# Patient Record
Sex: MALE | Race: WHITE | Employment: OTHER | URBAN - METROPOLITAN AREA
[De-identification: names, ages, dates, MRNs, and addresses within clinical notes are randomized per-mention and may not be internally consistent; named-entity substitution may affect disease eponyms.]

---

## 2022-02-04 ENCOUNTER — APPOINTMENT (OUTPATIENT)
Dept: GENERAL RADIOLOGY | Age: 52
End: 2022-02-04
Attending: PHYSICIAN ASSISTANT

## 2022-02-04 ENCOUNTER — HOSPITAL ENCOUNTER (EMERGENCY)
Age: 52
Discharge: HOME | End: 2022-02-04
Attending: EMERGENCY MEDICINE

## 2022-02-04 ENCOUNTER — APPOINTMENT (OUTPATIENT)
Dept: MRI IMAGING | Age: 52
End: 2022-02-04
Attending: PHYSICIAN ASSISTANT

## 2022-02-04 ENCOUNTER — APPOINTMENT (OUTPATIENT)
Dept: CT IMAGING | Age: 52
End: 2022-02-04
Attending: PHYSICIAN ASSISTANT

## 2022-02-04 VITALS
HEART RATE: 64 BPM | RESPIRATION RATE: 17 BRPM | BODY MASS INDEX: 28.8 KG/M2 | SYSTOLIC BLOOD PRESSURE: 170 MMHG | TEMPERATURE: 97.9 F | DIASTOLIC BLOOD PRESSURE: 82 MMHG | OXYGEN SATURATION: 96 % | WEIGHT: 195 LBS

## 2022-02-04 DIAGNOSIS — R42 DIZZINESS: Primary | ICD-10-CM

## 2022-02-04 DIAGNOSIS — R20.0 LEFT FACIAL NUMBNESS: ICD-10-CM

## 2022-02-04 DIAGNOSIS — I10 HYPERTENSION, UNSPECIFIED TYPE: ICD-10-CM

## 2022-02-04 LAB
AMPHET UR QL SCN: NEGATIVE
ANION GAP SERPL CALC-SCNC: 2 MMOL/L (ref 3–18)
APTT PPP: 27.8 SEC (ref 23–36.4)
BARBITURATES UR QL SCN: NEGATIVE
BASOPHILS # BLD: 0 K/UL (ref 0–0.1)
BASOPHILS NFR BLD: 0 % (ref 0–2)
BENZODIAZ UR QL: NEGATIVE
BUN SERPL-MCNC: 20 MG/DL (ref 7–18)
BUN/CREAT SERPL: 16 (ref 12–20)
CALCIUM SERPL-MCNC: 9.5 MG/DL (ref 8.5–10.1)
CANNABINOIDS UR QL SCN: NEGATIVE
CHLORIDE SERPL-SCNC: 109 MMOL/L (ref 100–111)
CHOLEST SERPL-MCNC: 233 MG/DL
CK MB CFR SERPL CALC: 1.6 % (ref 0–4)
CK MB SERPL-MCNC: 1.8 NG/ML (ref 5–25)
CK SERPL-CCNC: 115 U/L (ref 39–308)
CO2 SERPL-SCNC: 29 MMOL/L (ref 21–32)
COCAINE UR QL SCN: NEGATIVE
CREAT SERPL-MCNC: 1.26 MG/DL (ref 0.6–1.3)
DIFFERENTIAL METHOD BLD: ABNORMAL
EOSINOPHIL # BLD: 0.2 K/UL (ref 0–0.4)
EOSINOPHIL NFR BLD: 3 % (ref 0–5)
ERYTHROCYTE [DISTWIDTH] IN BLOOD BY AUTOMATED COUNT: 12.8 % (ref 11.6–14.5)
EST. AVERAGE GLUCOSE BLD GHB EST-MCNC: 117 MG/DL
ETHANOL SERPL-MCNC: <3 MG/DL (ref 0–3)
GLUCOSE BLD STRIP.AUTO-MCNC: 117 MG/DL (ref 70–110)
GLUCOSE SERPL-MCNC: 129 MG/DL (ref 74–99)
HBA1C MFR BLD: 5.7 % (ref 4.2–5.6)
HCT VFR BLD AUTO: 44.3 % (ref 36–48)
HDLC SERPL-MCNC: 50 MG/DL (ref 40–60)
HDLC SERPL: 4.7 {RATIO} (ref 0–5)
HDSCOM,HDSCOM: NORMAL
HGB BLD-MCNC: 15.3 G/DL (ref 13–16)
IMM GRANULOCYTES # BLD AUTO: 0 K/UL (ref 0–0.04)
IMM GRANULOCYTES NFR BLD AUTO: 1 % (ref 0–0.5)
INR PPP: 1 (ref 0.8–1.2)
LDLC SERPL CALC-MCNC: 146.6 MG/DL (ref 0–100)
LIPID PROFILE,FLP: ABNORMAL
LYMPHOCYTES # BLD: 1.3 K/UL (ref 0.9–3.6)
LYMPHOCYTES NFR BLD: 19 % (ref 21–52)
MCH RBC QN AUTO: 31.4 PG (ref 24–34)
MCHC RBC AUTO-ENTMCNC: 34.5 G/DL (ref 31–37)
MCV RBC AUTO: 90.8 FL (ref 78–100)
METHADONE UR QL: NEGATIVE
MONOCYTES # BLD: 0.6 K/UL (ref 0.05–1.2)
MONOCYTES NFR BLD: 8 % (ref 3–10)
NEUTS SEG # BLD: 4.8 K/UL (ref 1.8–8)
NEUTS SEG NFR BLD: 69 % (ref 40–73)
NRBC # BLD: 0 K/UL (ref 0–0.01)
NRBC BLD-RTO: 0 PER 100 WBC
OPIATES UR QL: NEGATIVE
PCP UR QL: NEGATIVE
PLATELET # BLD AUTO: 219 K/UL (ref 135–420)
PMV BLD AUTO: 10.6 FL (ref 9.2–11.8)
POTASSIUM SERPL-SCNC: 4.7 MMOL/L (ref 3.5–5.5)
PROTHROMBIN TIME: 12.4 SEC (ref 11.5–15.2)
RBC # BLD AUTO: 4.88 M/UL (ref 4.35–5.65)
SODIUM SERPL-SCNC: 140 MMOL/L (ref 136–145)
TRIGL SERPL-MCNC: 182 MG/DL (ref ?–150)
TROPONIN-HIGH SENSITIVITY: 4 NG/L (ref 0–78)
TSH SERPL DL<=0.05 MIU/L-ACNC: 3.71 UIU/ML (ref 0.36–3.74)
VLDLC SERPL CALC-MCNC: 36.4 MG/DL
WBC # BLD AUTO: 6.9 K/UL (ref 4.6–13.2)

## 2022-02-04 PROCEDURE — 84484 ASSAY OF TROPONIN QUANT: CPT

## 2022-02-04 PROCEDURE — 71045 X-RAY EXAM CHEST 1 VIEW: CPT

## 2022-02-04 PROCEDURE — 70450 CT HEAD/BRAIN W/O DYE: CPT

## 2022-02-04 PROCEDURE — 85025 COMPLETE CBC W/AUTO DIFF WBC: CPT

## 2022-02-04 PROCEDURE — 82077 ASSAY SPEC XCP UR&BREATH IA: CPT

## 2022-02-04 PROCEDURE — 85610 PROTHROMBIN TIME: CPT

## 2022-02-04 PROCEDURE — 82550 ASSAY OF CK (CPK): CPT

## 2022-02-04 PROCEDURE — 82962 GLUCOSE BLOOD TEST: CPT

## 2022-02-04 PROCEDURE — 80048 BASIC METABOLIC PNL TOTAL CA: CPT

## 2022-02-04 PROCEDURE — 99285 EMERGENCY DEPT VISIT HI MDM: CPT

## 2022-02-04 PROCEDURE — 93005 ELECTROCARDIOGRAM TRACING: CPT

## 2022-02-04 PROCEDURE — 74011250636 HC RX REV CODE- 250/636: Performed by: PHYSICIAN ASSISTANT

## 2022-02-04 PROCEDURE — 96374 THER/PROPH/DIAG INJ IV PUSH: CPT

## 2022-02-04 PROCEDURE — 74011250637 HC RX REV CODE- 250/637: Performed by: PHYSICIAN ASSISTANT

## 2022-02-04 PROCEDURE — 82553 CREATINE MB FRACTION: CPT

## 2022-02-04 PROCEDURE — 80307 DRUG TEST PRSMV CHEM ANLYZR: CPT

## 2022-02-04 PROCEDURE — 70551 MRI BRAIN STEM W/O DYE: CPT

## 2022-02-04 PROCEDURE — 85730 THROMBOPLASTIN TIME PARTIAL: CPT

## 2022-02-04 PROCEDURE — 83036 HEMOGLOBIN GLYCOSYLATED A1C: CPT

## 2022-02-04 PROCEDURE — 84443 ASSAY THYROID STIM HORMONE: CPT

## 2022-02-04 PROCEDURE — 80061 LIPID PANEL: CPT

## 2022-02-04 RX ORDER — HYDRALAZINE HYDROCHLORIDE 20 MG/ML
10 INJECTION INTRAMUSCULAR; INTRAVENOUS ONCE
Status: COMPLETED | OUTPATIENT
Start: 2022-02-04 | End: 2022-02-04

## 2022-02-04 RX ORDER — ASPIRIN 325 MG
325 TABLET ORAL
Status: COMPLETED | OUTPATIENT
Start: 2022-02-04 | End: 2022-02-04

## 2022-02-04 RX ORDER — LORAZEPAM 2 MG/ML
1 INJECTION INTRAMUSCULAR ONCE
Status: DISCONTINUED | OUTPATIENT
Start: 2022-02-04 | End: 2022-02-04 | Stop reason: HOSPADM

## 2022-02-04 RX ADMIN — HYDRALAZINE HYDROCHLORIDE 10 MG: 20 INJECTION INTRAMUSCULAR; INTRAVENOUS at 11:44

## 2022-02-04 RX ADMIN — SODIUM CHLORIDE, POTASSIUM CHLORIDE, SODIUM LACTATE AND CALCIUM CHLORIDE 500 ML: 600; 310; 30; 20 INJECTION, SOLUTION INTRAVENOUS at 12:08

## 2022-02-04 RX ADMIN — ASPIRIN 325 MG ORAL TABLET 325 MG: 325 PILL ORAL at 11:43

## 2022-02-04 NOTE — ED PROVIDER NOTES
EMERGENCY DEPARTMENT HISTORY AND PHYSICAL EXAM    Date: 2/4/2022  Patient Name: Alondra Harris    History of Presenting Illness     Chief Complaint   Patient presents with    Extremity Weakness         History Provided By: Patient    Chief Complaint: dizziness, facial numbness    HPI(Context):   11:06 AM  Alondra Harris is a 46 y.o. male with PMHX of HTN, borderline DM who presents to the emergency department C/O dizziness. Associated sxs include left sided facial numbness. Pt notes normal last night. Awoke this AM with \"equilibrium problems\" at 0730. Pt feels he was leaning to the left when walking. Pt states 2 hours later at 0900 he felt tingling and numbness to left side of face which \"feels cold. \" Pt has hx of HTN. No hx of CVA/TIA. Pt denies headache, visual change, unilateral extremity weakness/numbness, tobacco use, viral prodrome, and any other sxs or complaints. PCP: Salud Blackman DO    Current Facility-Administered Medications   Medication Dose Route Frequency Provider Last Rate Last Admin    LORazepam (ATIVAN) injection 1 mg  1 mg IntraVENous ONCE Nan Spencer PA-C         Current Outpatient Medications   Medication Sig Dispense Refill    losartan (COZAAR) 50 mg tablet Take 50 mg by mouth daily. Past History     Past Medical History:  Past Medical History:   Diagnosis Date    Hypertension        Past Surgical History:  No past surgical history on file. Family History:  No family history on file. Social History:  Social History     Tobacco Use    Smoking status: Never Smoker    Smokeless tobacco: Not on file   Substance Use Topics    Alcohol use: Yes     Comment: everyday    Drug use: Not on file       Allergies: Allergies   Allergen Reactions    Duricef [Cefadroxil] Nausea and Vomiting    Erythromycin Nausea Only         Review of Systems   Review of Systems   Respiratory: Negative for shortness of breath. Cardiovascular: Negative for chest pain.    Gastrointestinal: Negative for nausea and vomiting. Musculoskeletal: Positive for gait problem. Neurological: Positive for numbness (left side of face). Negative for facial asymmetry, speech difficulty, weakness and headaches. All other systems reviewed and are negative. Physical Exam     Vitals:    02/04/22 1155 02/04/22 1220 02/04/22 1325 02/04/22 1410   BP: (!) 167/85 (!) 164/89 (!) 164/78 (!) 170/82   Pulse:  69 68 64   Resp:  14 14 17   Temp:       SpO2:  97% 97% 96%   Weight:         Physical Exam  Vitals and nursing note reviewed. Constitutional:       General: He is not in acute distress. Appearance: He is well-developed. He is not diaphoretic. Comments:  male in NAD. Alert. In triage chair. Answering questions. Following direction. No slurred speech or appreciable facial droop   HENT:      Head: Normocephalic and atraumatic. No right periorbital erythema or left periorbital erythema. Jaw: No trismus. Right Ear: External ear normal.      Left Ear: External ear normal.      Nose: Nose normal.      Mouth/Throat:      Mouth: No oral lesions. Dentition: No dental abscesses. Pharynx: Uvula midline. No oropharyngeal exudate, posterior oropharyngeal erythema or uvula swelling. Tonsils: No tonsillar abscesses. Eyes:      General: No scleral icterus. Right eye: No discharge. Left eye: No discharge. Extraocular Movements: Extraocular movements intact. Conjunctiva/sclera: Conjunctivae normal.      Pupils: Pupils are equal, round, and reactive to light. Cardiovascular:      Rate and Rhythm: Normal rate and regular rhythm. Heart sounds: Normal heart sounds. No murmur heard. No friction rub. No gallop. Pulmonary:      Effort: Pulmonary effort is normal. No tachypnea, accessory muscle usage or respiratory distress. Breath sounds: Normal breath sounds. No decreased breath sounds, wheezing, rhonchi or rales.    Musculoskeletal:         General: Normal range of motion. Cervical back: Normal range of motion. Lymphadenopathy:      Cervical: No cervical adenopathy. Skin:     General: Skin is warm and dry. Neurological:      Mental Status: He is alert and oriented to person, place, and time. GCS: GCS eye subscore is 4. GCS verbal subscore is 5. GCS motor subscore is 6. Cranial Nerves: Cranial nerves are intact. No dysarthria or facial asymmetry. Sensory: Sensation is intact. Motor: Motor function is intact. No weakness, tremor or pronator drift. Coordination: Coordination is intact. Romberg sign negative. Coordination normal. Finger-Nose-Finger Test normal.      Gait: Gait is intact.  Gait normal.   Psychiatric:         Judgment: Judgment normal.             Diagnostic Study Results     Labs -     Recent Results (from the past 12 hour(s))   GLUCOSE, POC    Collection Time: 02/04/22 11:02 AM   Result Value Ref Range    Glucose (POC) 117 (H) 70 - 306 mg/dL   METABOLIC PANEL, BASIC    Collection Time: 02/04/22 11:15 AM   Result Value Ref Range    Sodium 140 136 - 145 mmol/L    Potassium 4.7 3.5 - 5.5 mmol/L    Chloride 109 100 - 111 mmol/L    CO2 29 21 - 32 mmol/L    Anion gap 2 (L) 3.0 - 18 mmol/L    Glucose 129 (H) 74 - 99 mg/dL    BUN 20 (H) 7.0 - 18 MG/DL    Creatinine 1.26 0.6 - 1.3 MG/DL    BUN/Creatinine ratio 16 12 - 20      GFR est AA >60 >60 ml/min/1.73m2    GFR est non-AA 60 (L) >60 ml/min/1.73m2    Calcium 9.5 8.5 - 10.1 MG/DL   CBC WITH AUTOMATED DIFF    Collection Time: 02/04/22 11:15 AM   Result Value Ref Range    WBC 6.9 4.6 - 13.2 K/uL    RBC 4.88 4.35 - 5.65 M/uL    HGB 15.3 13.0 - 16.0 g/dL    HCT 44.3 36.0 - 48.0 %    MCV 90.8 78.0 - 100.0 FL    MCH 31.4 24.0 - 34.0 PG    MCHC 34.5 31.0 - 37.0 g/dL    RDW 12.8 11.6 - 14.5 %    PLATELET 043 691 - 573 K/uL    MPV 10.6 9.2 - 11.8 FL    NRBC 0.0 0  WBC    ABSOLUTE NRBC 0.00 0.00 - 0.01 K/uL    NEUTROPHILS 69 40 - 73 %    LYMPHOCYTES 19 (L) 21 - 52 % MONOCYTES 8 3 - 10 %    EOSINOPHILS 3 0 - 5 %    BASOPHILS 0 0 - 2 %    IMMATURE GRANULOCYTES 1 (H) 0.0 - 0.5 %    ABS. NEUTROPHILS 4.8 1.8 - 8.0 K/UL    ABS. LYMPHOCYTES 1.3 0.9 - 3.6 K/UL    ABS. MONOCYTES 0.6 0.05 - 1.2 K/UL    ABS. EOSINOPHILS 0.2 0.0 - 0.4 K/UL    ABS. BASOPHILS 0.0 0.0 - 0.1 K/UL    ABS. IMM.  GRANS. 0.0 0.00 - 0.04 K/UL    DF AUTOMATED     PROTHROMBIN TIME + INR    Collection Time: 02/04/22 11:15 AM   Result Value Ref Range    Prothrombin time 12.4 11.5 - 15.2 sec    INR 1.0 0.8 - 1.2     PTT    Collection Time: 02/04/22 11:15 AM   Result Value Ref Range    aPTT 27.8 23.0 - 36.4 SEC   TROPONIN-HIGH SENSITIVITY    Collection Time: 02/04/22 11:15 AM   Result Value Ref Range    Troponin-High Sensitivity 4 0 - 78 ng/L   CK    Collection Time: 02/04/22 11:15 AM   Result Value Ref Range     39 - 308 U/L   ETHYL ALCOHOL    Collection Time: 02/04/22 11:15 AM   Result Value Ref Range    ALCOHOL(ETHYL),SERUM <3 0 - 3 MG/DL   CK-MB,QT    Collection Time: 02/04/22 11:15 AM   Result Value Ref Range    CK - MB 1.8 <3.6 ng/ml    CK-MB Index 1.6 0.0 - 4.0 %   TSH 3RD GENERATION    Collection Time: 02/04/22 11:15 AM   Result Value Ref Range    TSH 3.71 0.36 - 3.74 uIU/mL   EKG, 12 LEAD, INITIAL    Collection Time: 02/04/22 11:19 AM   Result Value Ref Range    Ventricular Rate 57 BPM    Atrial Rate 57 BPM    P-R Interval 176 ms    QRS Duration 108 ms    Q-T Interval 392 ms    QTC Calculation (Bezet) 381 ms    Calculated P Axis 31 degrees    Calculated R Axis 138 degrees    Calculated T Axis 30 degrees    Diagnosis       Sinus bradycardia with premature atrial complexes with aberrant conduction  Right axis deviation  Abnormal ECG  When compared with ECG of 06-AUG-2015 13:59,  aberrant conduction is now present     DRUG SCREEN, URINE    Collection Time: 02/04/22 12:45 PM   Result Value Ref Range    BENZODIAZEPINES Negative NEG      BARBITURATES Negative NEG      THC (TH-CANNABINOL) Negative NEG      OPIATES Negative NEG      PCP(PHENCYCLIDINE) Negative NEG      COCAINE Negative NEG      AMPHETAMINES Negative NEG      METHADONE Negative NEG      HDSCOM (NOTE)        MRI BRAIN WO CONT   Final Result   No significant intracranial abnormality is demonstrated. XR CHEST PORT   Final Result      No acute findings in the chest.       CT HEAD WO CONT   Final Result      1. No acute intracranial abnormalities are identified. Please note that head CT may be negative in the acute setting and MRI could best   further evaluate for acute/subacute ischemia/infarct in the high/persistent   index of clinical suspicion. Critical result called to ER JANET Spencer, at 11:14 AM on 02/04/22, as per stroke   alert protocol. CT Results  (Last 48 hours)               02/04/22 1111  CT HEAD WO CONT Final result    Impression:      1. No acute intracranial abnormalities are identified. Please note that head CT may be negative in the acute setting and MRI could best   further evaluate for acute/subacute ischemia/infarct in the high/persistent   index of clinical suspicion. Critical result called to ER JANET Spencer, at 11:14 AM on 02/04/22, as per stroke   alert protocol. Narrative:  EXAM: CT head       HISTORY:    -Provided with order: Code S   -Additional: Left-sided weakness. COMPARISON: None. TECHNIQUE: Axial CT imaging of the head was performed without intravenous   contrast. Sagittal and coronal reconstructions were created from the axial data. One or more dose reduction techniques were used on this CT: automated exposure   control, adjustment of the mAs and/or kVp according to patient size, and   iterative reconstruction techniques. The specific techniques used on this CT   exam have been documented in the patient's electronic medical record.        Digital Imaging and Communications in Medicine (DICOM) format image data are   available to nonaffiliated external healthcare facilities or entities on a   secure, media free, reciprocally searchable basis with patient authorization for   at least a 12-month period after this study. _______________       FINDINGS:           BRAIN, POSTERIOR FOSSA, EXTRA-AXIAL SPACES AND MENINGES:    The sulci, folia, ventricles and basal cisterns are   within normal limits for   the patient's age. There are no areas of abnormal parenchymal attenuation. There is no intracranial hemorrhage, mass effect, or midline shift. There are no abnormal extra-axial fluid collections. CALVARIUM: Intact. SINUSES/MASTOIDS: Clear in visualized extent. OTHER: None.        _______________               CXR Results  (Last 48 hours)               02/04/22 1155  XR CHEST PORT Final result    Impression:      No acute findings in the chest.        Narrative:  EXAM: XR CHEST PORT       CLINICAL INDICATION/HISTORY: Code S   -Additional: None       COMPARISON: 8/6/2015       TECHNIQUE: Frontal view of the chest       _______________       FINDINGS:       HEART AND MEDIASTINUM: Normal cardiac size and mediastinal contours. LUNGS AND PLEURAL SPACES: No focal pneumonic consolidation, pneumothorax, or   pleural effusion. BONY THORAX AND SOFT TISSUES: No acute osseous abnormality       _______________                 Medications given in the ED-  Medications   LORazepam (ATIVAN) injection 1 mg (has no administration in time range)   aspirin tablet 325 mg (325 mg Oral Given 2/4/22 1143)   hydrALAZINE (APRESOLINE) 20 mg/mL injection 10 mg (10 mg IntraVENous Given 2/4/22 1144)   lactated ringers bolus infusion 500 mL (0 mL IntraVENous IV Completed 2/4/22 1422)         Medical Decision Making   I am the first provider for this patient. I reviewed the vital signs, available nursing notes, past medical history, past surgical history, family history and social history. Vital Signs-Reviewed the patient's vital signs.     Pulse Oximetry Analysis - 100% on RA. NORMAL     Records Reviewed: Nursing Notes and Old Medical Records    Provider Notes (Medical Decision Making): CVA/TIA, intracranial bleeding, [place], metabolic derangement, hypoglycemia, vertigo, labyrinthitis, meniere's, arhythmia. Doubt ACS/MI    Procedures:  Procedures    ED Course:   11:06 AM Initial assessment performed. The patients presenting problems have been discussed, and they are in agreement with the care plan formulated and outlined with them. I have encouraged them to ask questions as they arise throughout their visit. Diagnosis and Disposition       11:04AM   Code S called from triage    11:10 AM Consult: I discussed care with Dr. Jana Waters (tele neurology) It was a standard discussion, including history of patients chief complaint, available diagnostic results, and treatment course. Will see patient    11:19 AM  Received call from Dr. Lake He (radiology). CT HEAD neg    11:38 AM  Received call back from Dr. Zenaida Blum. No TPA. Admit for CVA workup. 12:09 PM Consult: I discussed care with Dr. Jose Manuel Zacarias (in house neurologist) It was a standard discussion, including history of patients chief complaint, available diagnostic results, and treatment course. Recommends MRI. If negative, discharge. 12:09 PM  Reassessment reveals NIH score 0. Pt feels back to baseline. No FND on my repeated neuro exam.     MRI negative for CVA or other acute process. No FND. Pt denies any sxs. PCP and neuro FU. Reasons to RTED discussed with pt. All questions answered. Pt feels comfortable going home at this time. Pt expressed understanding and he agrees with plan. 1. Dizziness    2. Left facial numbness    3. Hypertension, unspecified type        PLAN:  1. D/C Home  2. Discharge Medication List as of 2/4/2022  2:10 PM        3.    Follow-up Information     Follow up With Specialties Details Why Contact Tash Valenzuela DO Family Medicine  as scheduled on Feb R [address]      Debo Whittaker MD Neurology  please follow up with neurologist [address] [place] [address] Drive  [facility] [address] [place] [id number] [phone]      [facility] EMERGENCY DE[facility] Emergency Medicine   [address] [facility] [address] [phone]        _______________________________    Attestations: This note is prepared by Anna Leong PA-C.  _______________________________        Please note that this dictation was completed with [employer], the computer voice recognition software. Quite often unanticipated grammatical, syntax, homophones, and other interpretive errors are inadvertently transcribed by the computer software. Please disregard these errors. Please excuse any errors that have escaped final proofreading.

## 2022-02-04 NOTE — ED TRIAGE NOTES
Pt presents for decreased L-sided facial sensation and abn gait upon awakening.  Currently pt states his L lip and L cheek feel \"tingly\"   LKW- 1030 2/3  Drink 3-6 beers/day

## 2022-02-05 LAB
ATRIAL RATE: 57 BPM
CALCULATED P AXIS, ECG09: 31 DEGREES
CALCULATED R AXIS, ECG10: 138 DEGREES
CALCULATED T AXIS, ECG11: 30 DEGREES
DIAGNOSIS, 93000: NORMAL
P-R INTERVAL, ECG05: 176 MS
Q-T INTERVAL, ECG07: 392 MS
QRS DURATION, ECG06: 108 MS
QTC CALCULATION (BEZET), ECG08: 381 MS
VENTRICULAR RATE, ECG03: 57 BPM